# Patient Record
Sex: FEMALE | Race: BLACK OR AFRICAN AMERICAN | NOT HISPANIC OR LATINO | Employment: OTHER | URBAN - METROPOLITAN AREA
[De-identification: names, ages, dates, MRNs, and addresses within clinical notes are randomized per-mention and may not be internally consistent; named-entity substitution may affect disease eponyms.]

---

## 2018-02-07 ENCOUNTER — SEE NOTE (OUTPATIENT)
Dept: URBAN - METROPOLITAN AREA CLINIC 36 | Facility: CLINIC | Age: 58
Setting detail: DERMATOLOGY
End: 2018-02-07

## 2018-02-07 PROCEDURE — 11100 BX SKIN SUBCUTANEOUS&/MUCOUS MEMBRANE 1 LESION: CPT

## 2018-02-07 PROCEDURE — 99202 OFFICE O/P NEW SF 15 MIN: CPT

## 2018-02-07 RX ORDER — HYDROXYCHLOROQUINE SULFATE 200 MG/1
1 TABLET TABLET ORAL BID
Qty: 60 | Refills: 3
Start: 2018-02-07

## 2018-02-07 RX ORDER — PIMECROLIMUS 10 MG/G
1 APPLICATION CREAM TOPICAL BID
Qty: 100 | Refills: 0
Start: 2018-02-07

## 2018-02-07 RX ORDER — HYDROQUINONE 40 MG/G
1 APPLICATION CREAM TOPICAL DAILY
Qty: 28.35 | Refills: 0
Start: 2018-02-07

## 2018-02-22 ENCOUNTER — FOLLOW UP (OUTPATIENT)
Dept: URBAN - METROPOLITAN AREA CLINIC 36 | Facility: CLINIC | Age: 58
Setting detail: DERMATOLOGY
End: 2018-02-22

## 2018-02-22 PROCEDURE — 11100 BX SKIN SUBCUTANEOUS&/MUCOUS MEMBRANE 1 LESION: CPT

## 2018-03-08 ENCOUNTER — FOLLOW UP (OUTPATIENT)
Dept: URBAN - METROPOLITAN AREA CLINIC 36 | Facility: CLINIC | Age: 58
Setting detail: DERMATOLOGY
End: 2018-03-08

## 2018-03-08 PROBLEM — L40.0 PSORIASIS VULGARIS: Status: RESOLVED | Noted: 2018-03-08

## 2018-03-08 PROCEDURE — 99213 OFFICE O/P EST LOW 20 MIN: CPT

## 2018-03-08 PROCEDURE — 99024 POSTOP FOLLOW-UP VISIT: CPT

## 2018-04-10 ENCOUNTER — FOLLOW UP (OUTPATIENT)
Dept: URBAN - METROPOLITAN AREA CLINIC 36 | Facility: CLINIC | Age: 58
Setting detail: DERMATOLOGY
End: 2018-04-10

## 2018-04-19 ENCOUNTER — FOLLOW UP (OUTPATIENT)
Dept: URBAN - METROPOLITAN AREA CLINIC 36 | Facility: CLINIC | Age: 58
Setting detail: DERMATOLOGY
End: 2018-04-19

## 2018-04-19 PROBLEM — L40.0 PSORIASIS VULGARIS: Status: RESOLVED | Noted: 2018-04-19

## 2018-04-19 PROCEDURE — 99213 OFFICE O/P EST LOW 20 MIN: CPT

## 2018-04-27 ENCOUNTER — RX ONLY (RX ONLY)
Age: 58
End: 2018-04-27

## 2018-04-27 RX ORDER — ADALIMUMAB 40MG/0.8ML
1 UNIT KIT SUBCUTANEOUS AS DIRECTED
Qty: 4 | Refills: 0
Start: 2018-04-27

## 2018-04-27 RX ORDER — CALCIPOTRIENE AND BETAMETHASONE DIPROPIONATE 50; .5 UG/G; MG/G
1 APPLICATION OINTMENT TOPICAL BID
Qty: 100 | Refills: 1
Start: 2018-04-27

## 2018-04-27 RX ORDER — ADALIMUMAB 40MG/0.8ML
1 UNIT KIT SUBCUTANEOUS
Qty: 6 | Refills: 2
Start: 2018-04-27

## 2018-05-09 ENCOUNTER — INJECTION OTHER (OUTPATIENT)
Dept: URBAN - METROPOLITAN AREA CLINIC 36 | Facility: CLINIC | Age: 58
Setting detail: DERMATOLOGY
End: 2018-05-09

## 2018-05-09 PROBLEM — L40.0 PSORIASIS VULGARIS: Status: RESOLVED | Noted: 2018-05-09

## 2018-05-09 PROCEDURE — 96372 THER/PROPH/DIAG INJ SC/IM: CPT

## 2018-05-09 PROCEDURE — 11901 INJECT SKIN LESIONS >7: CPT

## 2019-01-14 ENCOUNTER — RX ONLY (RX ONLY)
Age: 59
End: 2019-01-14

## 2019-01-14 RX ORDER — ADALIMUMAB 40MG/0.8ML
ADD'L SIG ADD'L SIG KIT SUBCUTANEOUS ADD'L SIG
Qty: 6 | Refills: 1
Start: 2019-01-14

## 2020-06-02 ENCOUNTER — TELEPHONE ENCOUNTER (OUTPATIENT)
Dept: URBAN - METROPOLITAN AREA CLINIC 92 | Facility: CLINIC | Age: 60
End: 2020-06-02

## 2020-06-05 ENCOUNTER — TELEPHONE ENCOUNTER (OUTPATIENT)
Dept: URBAN - METROPOLITAN AREA CLINIC 92 | Facility: CLINIC | Age: 60
End: 2020-06-05

## 2020-06-15 ENCOUNTER — TELEPHONE ENCOUNTER (OUTPATIENT)
Dept: URBAN - METROPOLITAN AREA CLINIC 92 | Facility: CLINIC | Age: 60
End: 2020-06-15

## 2020-06-17 ENCOUNTER — ERX REFILL RESPONSE (OUTPATIENT)
Age: 60
End: 2020-06-17

## 2020-06-17 RX ORDER — PANTOPRAZOLE SODIUM 40 MG/1
TAKE 1 TABLET BY MOUTH EVERY DAY TABLET, DELAYED RELEASE ORAL
Qty: 90 TABLET | Refills: 1 | OUTPATIENT

## 2020-06-17 RX ORDER — PANTOPRAZOLE SODIUM 40 MG/1
TAKE 1 TABLET BY MOUTH EVERY DAY TABLET, DELAYED RELEASE ORAL
Qty: 30 | Refills: 2 | OUTPATIENT

## 2020-06-26 ENCOUNTER — OFFICE VISIT (OUTPATIENT)
Dept: URBAN - METROPOLITAN AREA SURGERY CENTER 23 | Facility: SURGERY CENTER | Age: 60
End: 2020-06-26

## 2020-06-26 ENCOUNTER — OFFICE VISIT (OUTPATIENT)
Dept: URBAN - METROPOLITAN AREA SURGERY CENTER 23 | Facility: SURGERY CENTER | Age: 60
End: 2020-06-26
Payer: COMMERCIAL

## 2020-06-26 DIAGNOSIS — K29.60 ADENOPAPILLOMATOSIS GASTRICA: ICD-10-CM

## 2020-06-26 PROCEDURE — 43239 EGD BIOPSY SINGLE/MULTIPLE: CPT | Performed by: INTERNAL MEDICINE

## 2020-06-26 PROCEDURE — G8907 PT DOC NO EVENTS ON DISCHARG: HCPCS | Performed by: INTERNAL MEDICINE

## 2021-07-15 ENCOUNTER — TELEPHONE ENCOUNTER (OUTPATIENT)
Dept: URBAN - METROPOLITAN AREA CLINIC 92 | Facility: CLINIC | Age: 61
End: 2021-07-15

## 2021-07-15 RX ORDER — PANTOPRAZOLE SODIUM 40 MG/1
TAKE 1 TABLET BY MOUTH EVERY DAY TABLET, DELAYED RELEASE ORAL
Qty: 90 TABLET | Refills: 0

## 2021-07-15 RX ORDER — PANTOPRAZOLE SODIUM 40 MG/1
1 TABLET ORALLY ONCE A DAY TABLET, DELAYED RELEASE ORAL
Qty: 90 TABLET | Refills: 1

## 2021-08-09 ENCOUNTER — WEB ENCOUNTER (OUTPATIENT)
Dept: URBAN - METROPOLITAN AREA CLINIC 118 | Facility: CLINIC | Age: 61
End: 2021-08-09

## 2021-08-09 ENCOUNTER — OFFICE VISIT (OUTPATIENT)
Dept: URBAN - METROPOLITAN AREA CLINIC 118 | Facility: CLINIC | Age: 61
End: 2021-08-09
Payer: COMMERCIAL

## 2021-08-09 ENCOUNTER — OFFICE VISIT (OUTPATIENT)
Dept: URBAN - METROPOLITAN AREA CLINIC 118 | Facility: CLINIC | Age: 61
End: 2021-08-09

## 2021-08-09 DIAGNOSIS — K58.1 IRRITABLE BOWEL SYNDROME WITH CONSTIPATION: ICD-10-CM

## 2021-08-09 PROBLEM — 440630006: Status: ACTIVE | Noted: 2021-08-09

## 2021-08-09 PROCEDURE — 99214 OFFICE O/P EST MOD 30 MIN: CPT | Performed by: INTERNAL MEDICINE

## 2021-08-09 RX ORDER — LINACLOTIDE 290 UG/1
1 CAPSULE AT LEAST 30 MINUTES BEFORE THE FIRST MEAL OF THE DAY ON AN EMPTY STOMACH CAPSULE, GELATIN COATED ORAL ONCE A DAY
Qty: 30 | Refills: 6 | OUTPATIENT
Start: 2021-08-09 | End: 2022-03-06

## 2021-08-09 RX ORDER — LEVOTHYROXINE SODIUM 112 UG/1
1 TIME DAILY TABLET ORAL
Qty: 0 | Refills: 0 | Status: ACTIVE | COMMUNITY
Start: 1900-01-01

## 2021-08-09 RX ORDER — PANTOPRAZOLE SODIUM 40 MG/1
1 TABLET ORALLY ONCE A DAY TABLET, DELAYED RELEASE ORAL
Qty: 90 TABLET | Refills: 1 | Status: ACTIVE | COMMUNITY

## 2021-08-09 NOTE — HPI-TODAY'S VISIT:
abdominal pain, lower abd, cramping associated with constipation, good days and bad days, duration years, stable, better with bm worse with constipation constipation is stable, duration years egd/colon negative in past with Dr. Bravo US with sludge in GB

## 2022-02-03 ENCOUNTER — TELEPHONE ENCOUNTER (OUTPATIENT)
Dept: URBAN - METROPOLITAN AREA CLINIC 6 | Facility: CLINIC | Age: 62
End: 2022-02-03

## 2022-03-21 ENCOUNTER — OFFICE VISIT (OUTPATIENT)
Dept: URBAN - METROPOLITAN AREA CLINIC 109 | Facility: CLINIC | Age: 62
End: 2022-03-21

## 2022-03-28 ENCOUNTER — OFFICE VISIT (OUTPATIENT)
Dept: URBAN - METROPOLITAN AREA CLINIC 109 | Facility: CLINIC | Age: 62
End: 2022-03-28
Payer: COMMERCIAL

## 2022-03-28 VITALS
DIASTOLIC BLOOD PRESSURE: 75 MMHG | SYSTOLIC BLOOD PRESSURE: 117 MMHG | BODY MASS INDEX: 33.9 KG/M2 | HEART RATE: 87 BPM | TEMPERATURE: 97.3 F | WEIGHT: 216 LBS | HEIGHT: 67 IN

## 2022-03-28 DIAGNOSIS — K21.9 GASTROESOPHAGEAL REFLUX DISEASE WITHOUT ESOPHAGITIS: ICD-10-CM

## 2022-03-28 PROBLEM — 266435005: Status: ACTIVE | Noted: 2022-03-28

## 2022-03-28 PROCEDURE — 99213 OFFICE O/P EST LOW 20 MIN: CPT | Performed by: INTERNAL MEDICINE

## 2022-03-28 RX ORDER — FAMOTIDINE 40 MG/1
1 TABLET AT BEDTIME AS NEEDED TABLET, FILM COATED ORAL ONCE A DAY
Qty: 90 TABLET | Refills: 3

## 2022-03-28 RX ORDER — PANTOPRAZOLE SODIUM 40 MG/1
1 TABLET ORALLY ONCE A DAY TABLET, DELAYED RELEASE ORAL
Qty: 90 TABLET | Refills: 1 | Status: ACTIVE | COMMUNITY

## 2022-03-28 RX ORDER — LEVOTHYROXINE SODIUM 112 UG/1
1 TIME DAILY TABLET ORAL
Qty: 0 | Refills: 0 | Status: ACTIVE | COMMUNITY
Start: 1900-01-01

## 2022-03-28 RX ORDER — PANTOPRAZOLE SODIUM 40 MG/1
1 TABLET ORALLY ONCE A DAY TABLET, DELAYED RELEASE ORAL
Qty: 90 TABLET | Refills: 3

## 2022-03-28 NOTE — HPI-TODAY'S VISIT:
The patient returns for care of GERD. She describes severe burning at times despite medication. Currently taking pantoprazole 40mg daily and famotitine 40mg qhs. She has some breakthrough sx at times. She is trending in the right direction. She has been losing weight intentionally over the past 2 weeks.  Has lost more than intended. Appetite is fair, but has nausea intermittently. Has some alternating bowel habits.  She has been on a "fast" avoiding red meat, carbs, sweets.  No dysphagia, abdominal pain.  Rarely she has nausea every few weeks. Also taking hydrocodone sparingly.  EGD in June 2020 revealed mild h pylori negative gastritis and a normal esophagus. U/s in 2020 revealed a fatty liver but no GB stones. Colonoscopy in 2019 was normal. She had knee surgery last November.  Has CKD, now followed by a nephrologist, stage IIIa.

## 2022-03-28 NOTE — PHYSICAL EXAM NECK/THYROID:
normal appearance , without tenderness upon palpation , no deformities , trachea midline , Thyroid normal size , no thyroid nodules , no masses , no JVD , thyroid nontender Paige Wen  (RN)  2020 15:11:56

## 2022-04-30 ENCOUNTER — TELEPHONE ENCOUNTER (OUTPATIENT)
Dept: URBAN - METROPOLITAN AREA CLINIC 121 | Facility: CLINIC | Age: 62
End: 2022-04-30

## 2022-05-01 ENCOUNTER — TELEPHONE ENCOUNTER (OUTPATIENT)
Dept: URBAN - METROPOLITAN AREA CLINIC 121 | Facility: CLINIC | Age: 62
End: 2022-05-01

## 2022-05-01 RX ORDER — LORATADINE 5 MG
1 CAPFUL IN 8OZ H2O PO QD TABLET,CHEWABLE ORAL
Status: ACTIVE | COMMUNITY
Start: 2008-10-27

## 2022-05-01 RX ORDER — LEVOTHYROXINE SODIUM 88 MCG
TABLET ORAL
Status: ACTIVE | COMMUNITY
Start: 2008-10-27

## 2022-09-26 ENCOUNTER — OFFICE VISIT (OUTPATIENT)
Dept: URBAN - METROPOLITAN AREA CLINIC 109 | Facility: CLINIC | Age: 62
End: 2022-09-26

## 2022-09-26 RX ORDER — PANTOPRAZOLE SODIUM 40 MG/1
1 TABLET ORALLY ONCE A DAY TABLET, DELAYED RELEASE ORAL
Qty: 90 TABLET | Refills: 3 | COMMUNITY

## 2022-09-26 RX ORDER — FAMOTIDINE 40 MG/1
1 TABLET AT BEDTIME AS NEEDED TABLET, FILM COATED ORAL ONCE A DAY
Qty: 90 TABLET | Refills: 3 | COMMUNITY

## 2022-09-26 RX ORDER — LEVOTHYROXINE SODIUM 88 MCG
TABLET ORAL
COMMUNITY
Start: 2008-10-27

## 2022-09-26 RX ORDER — LORATADINE 5 MG
1 CAPFUL IN 8OZ H2O PO QD TABLET,CHEWABLE ORAL
COMMUNITY
Start: 2008-10-27

## 2022-09-26 RX ORDER — LEVOTHYROXINE SODIUM 112 UG/1
1 TIME DAILY TABLET ORAL
Qty: 0 | Refills: 0 | COMMUNITY
Start: 1900-01-01

## 2022-12-06 ENCOUNTER — OFFICE VISIT (OUTPATIENT)
Dept: URBAN - METROPOLITAN AREA CLINIC 109 | Facility: CLINIC | Age: 62
End: 2022-12-06

## 2022-12-06 RX ORDER — LEVOTHYROXINE SODIUM 88 MCG
TABLET ORAL
COMMUNITY
Start: 2008-10-27

## 2022-12-06 RX ORDER — LORATADINE 5 MG
1 CAPFUL IN 8OZ H2O PO QD TABLET,CHEWABLE ORAL
COMMUNITY
Start: 2008-10-27

## 2022-12-06 RX ORDER — PANTOPRAZOLE SODIUM 40 MG/1
1 TABLET ORALLY ONCE A DAY TABLET, DELAYED RELEASE ORAL
Qty: 90 TABLET | Refills: 3 | COMMUNITY

## 2022-12-06 RX ORDER — LEVOTHYROXINE SODIUM 112 UG/1
1 TIME DAILY TABLET ORAL
Qty: 0 | Refills: 0 | COMMUNITY
Start: 1900-01-01

## 2022-12-06 RX ORDER — FAMOTIDINE 40 MG/1
1 TABLET AT BEDTIME AS NEEDED TABLET, FILM COATED ORAL ONCE A DAY
Qty: 90 TABLET | Refills: 3 | COMMUNITY

## 2023-04-17 ENCOUNTER — ERX REFILL RESPONSE (OUTPATIENT)
Dept: URBAN - METROPOLITAN AREA CLINIC 109 | Facility: CLINIC | Age: 63
End: 2023-04-17

## 2023-04-17 RX ORDER — PANTOPRAZOLE SODIUM 40 MG/1
1 TABLET ORALLY ONCE A DAY TABLET, DELAYED RELEASE ORAL ONCE A DAY
Qty: 90 TABLET | Refills: 0 | OUTPATIENT

## 2023-04-17 RX ORDER — PANTOPRAZOLE SODIUM 40 MG/1
1 TABLET ORALLY ONCE A DAY TABLET, DELAYED RELEASE ORAL
Qty: 90 TABLET | Refills: 3 | OUTPATIENT

## 2023-04-18 ENCOUNTER — TELEPHONE ENCOUNTER (OUTPATIENT)
Dept: URBAN - METROPOLITAN AREA CLINIC 92 | Facility: CLINIC | Age: 63
End: 2023-04-18

## 2023-04-18 RX ORDER — FAMOTIDINE 40 MG/1
1 TABLET AT BEDTIME AS NEEDED TABLET, FILM COATED ORAL ONCE A DAY
Qty: 90 TABLET | Refills: 3

## 2023-12-01 ENCOUNTER — LAB OUTSIDE AN ENCOUNTER (OUTPATIENT)
Dept: URBAN - METROPOLITAN AREA CLINIC 118 | Facility: CLINIC | Age: 63
End: 2023-12-01

## 2023-12-01 ENCOUNTER — OFFICE VISIT (OUTPATIENT)
Dept: URBAN - METROPOLITAN AREA CLINIC 118 | Facility: CLINIC | Age: 63
End: 2023-12-01
Payer: COMMERCIAL

## 2023-12-01 ENCOUNTER — DASHBOARD ENCOUNTERS (OUTPATIENT)
Age: 63
End: 2023-12-01

## 2023-12-01 VITALS
HEIGHT: 66 IN | HEART RATE: 74 BPM | BODY MASS INDEX: 37.93 KG/M2 | DIASTOLIC BLOOD PRESSURE: 77 MMHG | TEMPERATURE: 97.7 F | WEIGHT: 236 LBS | SYSTOLIC BLOOD PRESSURE: 133 MMHG

## 2023-12-01 DIAGNOSIS — R74.8 ELEVATED LIVER ENZYMES: ICD-10-CM

## 2023-12-01 DIAGNOSIS — K59.09 CHRONIC CONSTIPATION: ICD-10-CM

## 2023-12-01 DIAGNOSIS — K76.0 FATTY LIVER: ICD-10-CM

## 2023-12-01 DIAGNOSIS — R10.13 EPIGASTRIC PAIN: ICD-10-CM

## 2023-12-01 PROBLEM — 35298007: Status: ACTIVE | Noted: 2023-12-01

## 2023-12-01 PROBLEM — 197321007: Status: ACTIVE | Noted: 2023-12-01

## 2023-12-01 PROCEDURE — 99214 OFFICE O/P EST MOD 30 MIN: CPT | Performed by: INTERNAL MEDICINE

## 2023-12-01 RX ORDER — LEVOTHYROXINE SODIUM 88 MCG
TABLET ORAL
Status: DISCONTINUED | COMMUNITY
Start: 2008-10-27

## 2023-12-01 RX ORDER — LORATADINE 5 MG
1 CAPFUL IN 8OZ H2O PO QD TABLET,CHEWABLE ORAL
Status: ACTIVE | COMMUNITY
Start: 2008-10-27

## 2023-12-01 RX ORDER — FAMOTIDINE 40 MG/1
1 TABLET AT BEDTIME AS NEEDED TABLET, FILM COATED ORAL ONCE A DAY
Qty: 90 TABLET | Refills: 3 | Status: ACTIVE | COMMUNITY

## 2023-12-01 RX ORDER — LEVOTHYROXINE SODIUM 112 UG/1
1 TIME DAILY TABLET ORAL
Qty: 0 | Refills: 0 | Status: ACTIVE | COMMUNITY
Start: 1900-01-01

## 2023-12-01 RX ORDER — OXYCODONE HYDROCHLORIDE 5 MG/1
TAKE 1 TABLET BY MOUTH TWICE A DAY TABLET ORAL
Qty: 60 EACH | Refills: 0 | Status: ACTIVE | COMMUNITY

## 2023-12-01 RX ORDER — PANTOPRAZOLE SODIUM 40 MG/1
1 TABLET ORALLY ONCE A DAY TABLET, DELAYED RELEASE ORAL ONCE A DAY
Qty: 90 TABLET | Refills: 0 | Status: ACTIVE | COMMUNITY

## 2023-12-01 NOTE — HPI-TODAY'S VISIT:
Ms. Forrester is a 63-year-old AAF with past medical history of GERD, Nissen fundoplication, fatty liver, HLD and psoriasis who presents today for evaluation of abdominal pain. She reports new onset of stomach "uneasiness" and soreness in the past 1 to 2 months.  She states this started when her nephew was diagnosed with stage IV liver cancer.  She suspects it is due to to her nerves/ anxiety around the situation.  She started pantoprazole that she had previously been prescribed for reflux and symptoms have now resolved.  She denies any abdominal pain/ soreness at present. She also has history of fatty liver disease and was previously following with Dr. Bravo.  She recently had labs 10/31/2023 which showed elevation in her liver enzymes, previously she did not have any liver enzyme abnormalities.  Labs show AST 45, ALT 34, alk phos 174 and T. bili 0.59. Her nephew has history of liver cancer, patient is unsure if he has history of cirrhosis or alcohol abuse.  Patient denies significant alcohol use, has 1 drink on special occasions, birthday, [place] etc.  She denies any new medications/ herbal supplements or recent infections. She currently has a bowel movement once every 2 to 3 days.  Typically [place] 1/2.  This is her normal. She denies GI bleeding, unintentional weight loss, reflux/heartburn, dysphagia, fever/chills or nausea/vomiting. Her last EGD was in 2020 and showed inflammation within the gastric antrum but otherwise was normal.  Her last colonoscopy was in 2019 which was normal.

## 2023-12-04 LAB
A/G RATIO: 1.4
ABSOLUTE BASOPHILS: 62
ABSOLUTE EOSINOPHILS: 148
ABSOLUTE LYMPHOCYTES: 1365
ABSOLUTE MONOCYTES: 361
ABSOLUTE NEUTROPHILS: 2165
AFP, SERUM, TUMOR MARKER: 5
ALBUMIN: 4.1
ALKALINE PHOSPHATASE: 128
ALT (SGPT): 24
AST (SGOT): 28
BASOPHILS: 1.5
BILIRUBIN, TOTAL: 0.7
BUN/CREATININE RATIO: 17
BUN: 18
CALCIUM: 9.3
CARBON DIOXIDE, TOTAL: 27
CHLORIDE: 105
CREATININE: 1.07
EGFR: 59
EOSINOPHILS: 3.6
GLOBULIN, TOTAL: 2.9
GLUCOSE: 88
HBSAG SCREEN: (no result)
HEMATOCRIT: 37
HEMOGLOBIN: 11.4
HEP A AB, IGM: (no result)
HEP B CORE AB, IGM: (no result)
HEPATITIS C ANTIBODY: (no result)
LYMPHOCYTES: 33.3
MCH: 22.5
MCHC: 30.8
MCV: 73.1
MONOCYTES: 8.8
MPV: 10.3
NEUTROPHILS: 52.8
PLATELET COUNT: 297
POTASSIUM: 4.2
PROTEIN, TOTAL: 7
RDW: 14.6
RED BLOOD CELL COUNT: 5.06
SODIUM: 141
WHITE BLOOD CELL COUNT: 4.1

## 2023-12-11 ENCOUNTER — OFFICE VISIT (OUTPATIENT)
Dept: URBAN - METROPOLITAN AREA CLINIC 117 | Facility: CLINIC | Age: 63
End: 2023-12-11
Payer: COMMERCIAL

## 2023-12-11 DIAGNOSIS — K76.0 FATTY (CHANGE OF) LIVER: ICD-10-CM

## 2023-12-11 DIAGNOSIS — R79.89 ELEVATED LFTS: ICD-10-CM

## 2023-12-11 PROCEDURE — 76705 ECHO EXAM OF ABDOMEN: CPT | Performed by: INTERNAL MEDICINE

## 2023-12-14 ENCOUNTER — TELEPHONE ENCOUNTER (OUTPATIENT)
Dept: URBAN - METROPOLITAN AREA CLINIC 118 | Facility: CLINIC | Age: 63
End: 2023-12-14

## 2024-03-01 ENCOUNTER — OV EP (OUTPATIENT)
Dept: URBAN - METROPOLITAN AREA CLINIC 118 | Facility: CLINIC | Age: 64
End: 2024-03-01

## 2024-07-10 ENCOUNTER — LAB OUTSIDE AN ENCOUNTER (OUTPATIENT)
Dept: URBAN - METROPOLITAN AREA CLINIC 88 | Facility: CLINIC | Age: 64
End: 2024-07-10

## 2024-07-10 ENCOUNTER — OFFICE VISIT (OUTPATIENT)
Dept: URBAN - METROPOLITAN AREA CLINIC 88 | Facility: CLINIC | Age: 64
End: 2024-07-10
Payer: COMMERCIAL

## 2024-07-10 VITALS
SYSTOLIC BLOOD PRESSURE: 145 MMHG | HEART RATE: 71 BPM | HEIGHT: 67 IN | OXYGEN SATURATION: 98 % | DIASTOLIC BLOOD PRESSURE: 83 MMHG | WEIGHT: 241 LBS | BODY MASS INDEX: 37.83 KG/M2 | TEMPERATURE: 97.3 F

## 2024-07-10 DIAGNOSIS — Z80.0 FAMILY HISTORY OF COLON CANCER: ICD-10-CM

## 2024-07-10 DIAGNOSIS — K59.09 CHANGE IN BOWEL MOVEMENTS INTERMITTENT CONSTIPATION. URGENCY IN THE MORNING.: ICD-10-CM

## 2024-07-10 DIAGNOSIS — K21.9 GERD WITHOUT ESOPHAGITIS: ICD-10-CM

## 2024-07-10 PROCEDURE — 99214 OFFICE O/P EST MOD 30 MIN: CPT | Performed by: NURSE PRACTITIONER

## 2024-07-10 RX ORDER — SUCRALFATE 1 G/1
1 TABLET ON AN EMPTY STOMACH TABLET ORAL TWICE A DAY
Qty: 60 | Refills: 1 | OUTPATIENT
Start: 2024-07-10 | End: 2024-09-08

## 2024-07-10 RX ORDER — FAMOTIDINE 40 MG/1
1 TABLET AT BEDTIME AS NEEDED TABLET, FILM COATED ORAL ONCE A DAY
Qty: 90 TABLET | Refills: 3 | Status: DISCONTINUED | COMMUNITY

## 2024-07-10 RX ORDER — OXYCODONE HYDROCHLORIDE 5 MG/1
TAKE 1 TABLET BY MOUTH TWICE A DAY TABLET ORAL
Qty: 60 EACH | Refills: 0 | Status: ACTIVE | COMMUNITY

## 2024-07-10 RX ORDER — LORATADINE 5 MG
1 CAPFUL IN 8OZ H2O PO QD TABLET,CHEWABLE ORAL
Status: DISCONTINUED | COMMUNITY
Start: 2008-10-27

## 2024-07-10 RX ORDER — AMLODIPINE BESYLATE 2.5 MG/1
1 TABLET TABLET ORAL ONCE A DAY
Status: ACTIVE | COMMUNITY

## 2024-07-10 RX ORDER — LEVOTHYROXINE SODIUM 112 UG/1
1 TIME DAILY TABLET ORAL
Qty: 0 | Refills: 0 | Status: ACTIVE | COMMUNITY
Start: 1900-01-01

## 2024-07-10 RX ORDER — SEMAGLUTIDE 0.25 MG/.5ML
0.5 ML INJECTION, SOLUTION SUBCUTANEOUS
Status: ACTIVE | COMMUNITY

## 2024-07-10 RX ORDER — PANTOPRAZOLE SODIUM 40 MG/1
1 TABLET ORALLY ONCE A DAY TABLET, DELAYED RELEASE ORAL ONCE A DAY
Qty: 90 TABLET | Refills: 0 | Status: ON HOLD | COMMUNITY

## 2024-07-10 RX ORDER — PANTOPRAZOLE SODIUM 40 MG/1
1 TABLET ORALLY ONCE A DAY TABLET, DELAYED RELEASE ORAL ONCE A DAY
Qty: 90 TABLET | Refills: 1

## 2024-07-10 NOTE — HPI-OTHER HISTORIES
--------------------------------------------------- Office note 12/01/2023: Ms. Forrester is a 63-year-old AAF with past medical history of GERD, Nissen fundoplication, fatty liver, HLD and psoriasis who presents today for evaluation of abdominal pain. She reports new onset of stomach "uneasiness" and soreness in the past 1 to 2 months. She states this started when her nephew was diagnosed with stage IV liver cancer. She suspects it is due to to her nerves/ anxiety around the situation. She started pantoprazole that she had previously been prescribed for reflux and symptoms have now resolved. She denies any abdominal pain/ soreness at present. She also has history of fatty liver disease and was previously following with Dr. Bravo. She recently had labs 10/31/2023 which showed elevation in her liver enzymes, previously she did not have any liver enzyme abnormalities. Labs show AST 45, ALT 34, alk phos 174 and T. bili 0.59. Her nephew has history of liver cancer, patient is unsure if he has history of cirrhosis or alcohol abuse. Patient denies significant alcohol use, has 1 drink on special occasions, birthday, [place] etc. She denies any new medications/ herbal supplements or recent infections. She currently has a bowel movement once every 2 to 3 days. Typically [place] 1/2. This is her normal. She denies GI bleeding, unintentional weight loss, reflux/heartburn, dysphagia, fever/chills or nausea/vomiting. Her last EGD was in 2020 and showed inflammation within the gastric antrum but otherwise was normal. Her last colonoscopy was in 2019 which was normal.

## 2024-07-10 NOTE — HPI-TODAY'S VISIT:
Patient with hx of fundoplication, constipation, CKD, psoriasis, HLD, and fatty liver disease presents today to schedule surveillance colonoscopy and for evaluation of c/o GERD.   Voices a family hx of colon cancer in her mother (diagnosed in her 80s).  Her last colonoscopy was in 2019 by Dr. Bravo with normal findings noted.   Defecation occurs every 2-3 days with occasional incomplete sense of evacuation of stool.  Uses natural or herbal regimens to manage her constipation.  Has also tried Miralax in past to manage constipation.    Over the last week, reports increase in symptoms of heartburn.  Symptoms occur every other day.  Unable to identify specific triggers.  Voices concern of status of fundoplication wrap  Started Wegovy a week ago for weight loss.

## 2024-07-12 ENCOUNTER — OFFICE VISIT (OUTPATIENT)
Dept: URBAN - METROPOLITAN AREA CLINIC 105 | Facility: CLINIC | Age: 64
End: 2024-07-12

## 2024-07-12 PROBLEM — 266435005: Status: ACTIVE | Noted: 2024-07-12

## 2024-07-12 PROBLEM — 35298007: Status: ACTIVE | Noted: 2024-07-12

## 2024-07-12 RX ORDER — OXYCODONE HYDROCHLORIDE 5 MG/1
TAKE 1 TABLET BY MOUTH TWICE A DAY TABLET ORAL
Qty: 60 EACH | Refills: 0 | Status: ACTIVE | COMMUNITY

## 2024-07-12 RX ORDER — LEVOTHYROXINE SODIUM 112 UG/1
1 TIME DAILY TABLET ORAL
Qty: 0 | Refills: 0 | Status: ACTIVE | COMMUNITY
Start: 1900-01-01

## 2024-07-12 RX ORDER — PANTOPRAZOLE SODIUM 40 MG/1
1 TABLET ORALLY ONCE A DAY TABLET, DELAYED RELEASE ORAL ONCE A DAY
Qty: 90 TABLET | Refills: 0 | Status: ACTIVE | COMMUNITY

## 2024-07-12 RX ORDER — FAMOTIDINE 40 MG/1
1 TABLET AT BEDTIME AS NEEDED TABLET, FILM COATED ORAL ONCE A DAY
Qty: 90 TABLET | Refills: 3 | Status: ACTIVE | COMMUNITY

## 2024-07-12 RX ORDER — LORATADINE 5 MG
1 CAPFUL IN 8OZ H2O PO QD TABLET,CHEWABLE ORAL
Status: ACTIVE | COMMUNITY
Start: 2008-10-27

## 2024-07-16 ENCOUNTER — OFFICE VISIT (OUTPATIENT)
Dept: URBAN - METROPOLITAN AREA CLINIC 88 | Facility: CLINIC | Age: 64
End: 2024-07-16

## 2024-07-19 ENCOUNTER — OFFICE VISIT (OUTPATIENT)
Dept: URBAN - METROPOLITAN AREA SURGERY CENTER 24 | Facility: SURGERY CENTER | Age: 64
End: 2024-07-19
Payer: COMMERCIAL

## 2024-07-19 ENCOUNTER — TELEPHONE ENCOUNTER (OUTPATIENT)
Dept: URBAN - METROPOLITAN AREA CLINIC 70 | Facility: CLINIC | Age: 64
End: 2024-07-19

## 2024-07-19 ENCOUNTER — CLAIMS CREATED FROM THE CLAIM WINDOW (OUTPATIENT)
Dept: URBAN - METROPOLITAN AREA CLINIC 4 | Facility: CLINIC | Age: 64
End: 2024-07-19
Payer: COMMERCIAL

## 2024-07-19 DIAGNOSIS — K63.5 COLON POLYPS: ICD-10-CM

## 2024-07-19 DIAGNOSIS — D12.4 BENIGN NEOPLASM OF DESCENDING COLON: ICD-10-CM

## 2024-07-19 DIAGNOSIS — Z12.11 ENCOUNTER FOR SCREENING FOR MALIGNANT NEOPLASM OF COLON: ICD-10-CM

## 2024-07-19 DIAGNOSIS — K21.9 GASTRO-ESOPHAGEAL REFLUX DISEASE WITHOUT ESOPHAGITIS: ICD-10-CM

## 2024-07-19 DIAGNOSIS — K29.60 OTHER GASTRITIS WITHOUT BLEEDING: ICD-10-CM

## 2024-07-19 DIAGNOSIS — K31.89 OTHER DISEASES OF STOMACH AND DUODENUM: ICD-10-CM

## 2024-07-19 DIAGNOSIS — K29.70 GASTRITIS, UNSPECIFIED, WITHOUT BLEEDING: ICD-10-CM

## 2024-07-19 DIAGNOSIS — D12.5 BENIGN NEOPLASM OF SIGMOID COLON: ICD-10-CM

## 2024-07-19 DIAGNOSIS — R12 HEARTBURN: ICD-10-CM

## 2024-07-19 DIAGNOSIS — Z12.11 COLON CANCER SCREENING: ICD-10-CM

## 2024-07-19 PROCEDURE — 43239 EGD BIOPSY SINGLE/MULTIPLE: CPT | Performed by: INTERNAL MEDICINE

## 2024-07-19 PROCEDURE — 45380 COLONOSCOPY AND BIOPSY: CPT | Performed by: INTERNAL MEDICINE

## 2024-07-19 PROCEDURE — 00813 ANES UPR LWR GI NDSC PX: CPT | Performed by: NURSE ANESTHETIST, CERTIFIED REGISTERED

## 2024-07-19 PROCEDURE — 88305 TISSUE EXAM BY PATHOLOGIST: CPT | Performed by: PATHOLOGY

## 2024-07-19 PROCEDURE — 88312 SPECIAL STAINS GROUP 1: CPT | Performed by: PATHOLOGY

## 2024-07-19 PROCEDURE — 45385 COLONOSCOPY W/LESION REMOVAL: CPT | Performed by: INTERNAL MEDICINE

## 2024-07-19 RX ORDER — SEMAGLUTIDE 0.25 MG/.5ML
0.5 ML INJECTION, SOLUTION SUBCUTANEOUS
Status: ACTIVE | COMMUNITY

## 2024-07-19 RX ORDER — FAMOTIDINE 40 MG/1
AS DIRECTED TABLET, FILM COATED ORAL
Qty: 60 | Refills: 0 | OUTPATIENT
Start: 2024-07-19

## 2024-07-19 RX ORDER — SUCRALFATE 1 G/1
1 TABLET ON AN EMPTY STOMACH TABLET ORAL TWICE A DAY
Qty: 60 | Refills: 1 | Status: ACTIVE | COMMUNITY
Start: 2024-07-10 | End: 2024-09-08

## 2024-07-19 RX ORDER — FAMOTIDINE 40 MG/1
1 TABLET AT BEDTIME AS NEEDED TABLET, FILM COATED ORAL ONCE A DAY
Qty: 90 TABLET | Refills: 3 | Status: ACTIVE | COMMUNITY

## 2024-07-19 RX ORDER — PANTOPRAZOLE SODIUM 40 MG/1
1 TABLET ORALLY ONCE A DAY TABLET, DELAYED RELEASE ORAL ONCE A DAY
Qty: 90 TABLET | Refills: 0 | Status: ACTIVE | COMMUNITY

## 2024-07-19 RX ORDER — LORATADINE 5 MG
1 CAPFUL IN 8OZ H2O PO QD TABLET,CHEWABLE ORAL
Status: ACTIVE | COMMUNITY
Start: 2008-10-27

## 2024-07-19 RX ORDER — AMLODIPINE BESYLATE 2.5 MG/1
1 TABLET TABLET ORAL ONCE A DAY
Status: ACTIVE | COMMUNITY

## 2024-07-19 RX ORDER — LEVOTHYROXINE SODIUM 112 UG/1
1 TIME DAILY TABLET ORAL
Qty: 0 | Refills: 0 | Status: ACTIVE | COMMUNITY
Start: 1900-01-01

## 2024-07-19 RX ORDER — OXYCODONE HYDROCHLORIDE 5 MG/1
TAKE 1 TABLET BY MOUTH TWICE A DAY TABLET ORAL
Qty: 60 EACH | Refills: 0 | Status: ACTIVE | COMMUNITY

## 2024-07-25 ENCOUNTER — TELEPHONE ENCOUNTER (OUTPATIENT)
Dept: URBAN - METROPOLITAN AREA CLINIC 88 | Facility: CLINIC | Age: 64
End: 2024-07-25

## 2024-08-14 ENCOUNTER — OFFICE VISIT (OUTPATIENT)
Dept: URBAN - METROPOLITAN AREA SURGERY CENTER 23 | Facility: SURGERY CENTER | Age: 64
End: 2024-08-14

## 2024-09-04 ENCOUNTER — OFFICE VISIT (OUTPATIENT)
Dept: URBAN - METROPOLITAN AREA CLINIC 88 | Facility: CLINIC | Age: 64
End: 2024-09-04

## 2024-09-04 RX ORDER — PANTOPRAZOLE SODIUM 40 MG/1
1 TABLET ORALLY ONCE A DAY TABLET, DELAYED RELEASE ORAL ONCE A DAY
Qty: 90 TABLET | Refills: 0 | Status: ACTIVE | COMMUNITY

## 2024-09-04 RX ORDER — FAMOTIDINE 40 MG/1
AS DIRECTED TABLET, FILM COATED ORAL
Qty: 60 | Refills: 0 | Status: ACTIVE | COMMUNITY
Start: 2024-07-19

## 2024-09-04 RX ORDER — AMLODIPINE BESYLATE 2.5 MG/1
1 TABLET TABLET ORAL ONCE A DAY
Status: ACTIVE | COMMUNITY

## 2024-09-04 RX ORDER — LORATADINE 5 MG
1 CAPFUL IN 8OZ H2O PO QD TABLET,CHEWABLE ORAL
Status: ACTIVE | COMMUNITY
Start: 2008-10-27

## 2024-09-04 RX ORDER — OXYCODONE HYDROCHLORIDE 5 MG/1
TAKE 1 TABLET BY MOUTH TWICE A DAY TABLET ORAL
Qty: 60 EACH | Refills: 0 | Status: ACTIVE | COMMUNITY

## 2024-09-04 RX ORDER — SUCRALFATE 1 G/1
1 TABLET ON AN EMPTY STOMACH TABLET ORAL TWICE A DAY
Qty: 60 | Refills: 1 | Status: ACTIVE | COMMUNITY
Start: 2024-07-10 | End: 2024-09-08

## 2024-09-04 RX ORDER — SEMAGLUTIDE 0.25 MG/.5ML
0.5 ML INJECTION, SOLUTION SUBCUTANEOUS
Status: ACTIVE | COMMUNITY

## 2024-09-04 RX ORDER — FAMOTIDINE 40 MG/1
1 TABLET AT BEDTIME AS NEEDED TABLET, FILM COATED ORAL ONCE A DAY
Qty: 90 TABLET | Refills: 3 | Status: ACTIVE | COMMUNITY

## 2024-09-04 RX ORDER — LEVOTHYROXINE SODIUM 112 UG/1
1 TIME DAILY TABLET ORAL
Qty: 0 | Refills: 0 | Status: ACTIVE | COMMUNITY
Start: 1900-01-01

## 2025-01-09 ENCOUNTER — WEB ENCOUNTER (OUTPATIENT)
Dept: URBAN - METROPOLITAN AREA CLINIC 88 | Facility: CLINIC | Age: 65
End: 2025-01-09

## 2025-01-09 RX ORDER — PANTOPRAZOLE SODIUM 40 MG/1
1 TABLET TABLET, DELAYED RELEASE ORAL
Qty: 90 TABLET | Refills: 1

## 2025-04-02 ENCOUNTER — LAB OUTSIDE AN ENCOUNTER (OUTPATIENT)
Dept: URBAN - METROPOLITAN AREA CLINIC 118 | Facility: CLINIC | Age: 65
End: 2025-04-02

## 2025-04-02 ENCOUNTER — OFFICE VISIT (OUTPATIENT)
Dept: URBAN - METROPOLITAN AREA CLINIC 118 | Facility: CLINIC | Age: 65
End: 2025-04-02
Payer: COMMERCIAL

## 2025-04-02 DIAGNOSIS — K21.9 GERD WITHOUT ESOPHAGITIS: ICD-10-CM

## 2025-04-02 DIAGNOSIS — R63.5 ABNORMAL WEIGHT GAIN: ICD-10-CM

## 2025-04-02 DIAGNOSIS — Z98.890 HISTORY OF NISSEN FUNDOPLICATION: ICD-10-CM

## 2025-04-02 DIAGNOSIS — K21.00 GASTROESOPHAGEAL REFLUX DISEASE WITH ESOPHAGITIS WITHOUT HEMORRHAGE: ICD-10-CM

## 2025-04-02 PROBLEM — 266433003: Status: ACTIVE | Noted: 2025-04-02

## 2025-04-02 PROCEDURE — 99214 OFFICE O/P EST MOD 30 MIN: CPT | Performed by: INTERNAL MEDICINE

## 2025-04-02 RX ORDER — FAMOTIDINE 40 MG/1
1 TABLET AT BEDTIME AS NEEDED TABLET, FILM COATED ORAL ONCE A DAY
Qty: 90 TABLET | Refills: 3 | Status: ACTIVE | COMMUNITY

## 2025-04-02 RX ORDER — PANTOPRAZOLE SODIUM 40 MG/1
1 TABLET TABLET, DELAYED RELEASE ORAL
Qty: 90 TABLET | Refills: 1 | Status: ACTIVE | COMMUNITY

## 2025-04-02 RX ORDER — LEVOTHYROXINE SODIUM 112 UG/1
1 TIME DAILY TABLET ORAL
Qty: 0 | Refills: 0 | Status: ACTIVE | COMMUNITY
Start: 1900-01-01

## 2025-04-02 RX ORDER — OXYCODONE HYDROCHLORIDE 5 MG/1
TAKE 1 TABLET BY MOUTH TWICE A DAY TABLET ORAL
Qty: 60 EACH | Refills: 0 | Status: ACTIVE | COMMUNITY

## 2025-04-02 RX ORDER — SEMAGLUTIDE 0.25 MG/.5ML
0.5 ML INJECTION, SOLUTION SUBCUTANEOUS
Status: ACTIVE | COMMUNITY

## 2025-04-02 RX ORDER — AMLODIPINE BESYLATE 2.5 MG/1
1 TABLET TABLET ORAL ONCE A DAY
Status: ACTIVE | COMMUNITY

## 2025-04-02 RX ORDER — SUCRALFATE 1 G/1
1 TABLET ASNEEDED TABLET ORAL TWICE A DAY
Qty: 60 TABLET | Refills: 11
Start: 2024-07-10

## 2025-04-02 RX ORDER — LORATADINE 5 MG
1 CAPFUL IN 8OZ H2O PO QD TABLET,CHEWABLE ORAL
Status: ACTIVE | COMMUNITY
Start: 2008-10-27

## 2025-04-02 NOTE — HPI-TODAY'S VISIT:
The patient is a 64-year-old female here to follow-up reflux disease.  She was last seen in our [place] office about a year ago where an upper and lower endoscopy showed diverticulosis, adenomatous polyps chronic gastritis, and reflux.  She had a Nissen fundoplication in 2004.  In the last 5 years she has gained 15 pounds.  She still has chronic heartburn with substernal burning requiring the use of Protonix daily therapy as well as Carafate for breakthrough.  Sometimes the Carafate does not resolve the symptoms.  There was no evidence of stenosis at the endoscopy and no severe esophageal ulcers.  She has had no recent imaging studies.

## 2025-07-24 ENCOUNTER — ERX REFILL RESPONSE (OUTPATIENT)
Dept: URBAN - METROPOLITAN AREA CLINIC 70 | Facility: CLINIC | Age: 65
End: 2025-07-24

## 2025-07-24 RX ORDER — PANTOPRAZOLE SODIUM 40 MG/1
1 TABLET TABLET, DELAYED RELEASE ORAL
Qty: 90 TABLET | Refills: 1 | OUTPATIENT

## 2025-07-24 RX ORDER — PANTOPRAZOLE SODIUM 40 MG/1
TAKE 1 TABLET BY MOUTH EVERY DAY FOR REFLUX TABLET, DELAYED RELEASE ORAL
Qty: 90 TABLET | Refills: 1 | OUTPATIENT